# Patient Record
Sex: MALE | Race: WHITE | NOT HISPANIC OR LATINO | Employment: FULL TIME | URBAN - METROPOLITAN AREA
[De-identification: names, ages, dates, MRNs, and addresses within clinical notes are randomized per-mention and may not be internally consistent; named-entity substitution may affect disease eponyms.]

---

## 2021-04-15 DIAGNOSIS — Z23 ENCOUNTER FOR IMMUNIZATION: ICD-10-CM

## 2025-01-18 ENCOUNTER — APPOINTMENT (EMERGENCY)
Dept: RADIOLOGY | Facility: HOSPITAL | Age: 55
End: 2025-01-18
Payer: COMMERCIAL

## 2025-01-18 ENCOUNTER — HOSPITAL ENCOUNTER (EMERGENCY)
Facility: HOSPITAL | Age: 55
Discharge: HOME | End: 2025-01-18
Attending: EMERGENCY MEDICINE | Admitting: EMERGENCY MEDICINE
Payer: COMMERCIAL

## 2025-01-18 VITALS
RESPIRATION RATE: 14 BRPM | TEMPERATURE: 97.3 F | HEART RATE: 80 BPM | OXYGEN SATURATION: 100 % | DIASTOLIC BLOOD PRESSURE: 90 MMHG | HEIGHT: 72 IN | BODY MASS INDEX: 34.9 KG/M2 | WEIGHT: 257.7 LBS | SYSTOLIC BLOOD PRESSURE: 133 MMHG

## 2025-01-18 DIAGNOSIS — J18.9 PNEUMONIA OF RIGHT LOWER LOBE DUE TO INFECTIOUS ORGANISM: Primary | ICD-10-CM

## 2025-01-18 DIAGNOSIS — R07.9 CHEST PAIN, UNSPECIFIED TYPE: ICD-10-CM

## 2025-01-18 LAB
ALBUMIN SERPL-MCNC: 4.3 G/DL (ref 3.5–5.7)
ALP SERPL-CCNC: 44 IU/L (ref 34–125)
ALT SERPL-CCNC: 36 IU/L (ref 7–52)
ANION GAP SERPL CALC-SCNC: 5 MEQ/L (ref 3–15)
AST SERPL-CCNC: 26 IU/L (ref 13–39)
ATRIAL RATE: 60
BASOPHILS # BLD: 0.06 K/UL (ref 0.01–0.1)
BASOPHILS NFR BLD: 0.9 %
BILIRUB SERPL-MCNC: 0.6 MG/DL (ref 0.3–1.2)
BUN SERPL-MCNC: 18 MG/DL (ref 7–25)
CALCIUM SERPL-MCNC: 9.2 MG/DL (ref 8.6–10.3)
CHLORIDE SERPL-SCNC: 103 MEQ/L (ref 98–107)
CO2 SERPL-SCNC: 31 MEQ/L (ref 21–31)
CREAT SERPL-MCNC: 0.8 MG/DL (ref 0.7–1.3)
D DIMER PPP IA.FEU-MCNC: <0.27 UG/ML FEU (ref 0–0.5)
DIFFERENTIAL METHOD BLD: ABNORMAL
EGFRCR SERPLBLD CKD-EPI 2021: >60 ML/MIN/1.73M*2
EOSINOPHIL # BLD: 0.26 K/UL (ref 0.04–0.54)
EOSINOPHIL NFR BLD: 3.7 %
ERYTHROCYTE [DISTWIDTH] IN BLOOD BY AUTOMATED COUNT: 13.1 % (ref 11.6–14.4)
GLUCOSE SERPL-MCNC: 110 MG/DL (ref 70–99)
HCT VFR BLD AUTO: 46.7 % (ref 40.1–51)
HGB BLD-MCNC: 15.5 G/DL (ref 13.7–17.5)
IMM GRANULOCYTES # BLD AUTO: 0.03 K/UL (ref 0–0.08)
IMM GRANULOCYTES NFR BLD AUTO: 0.4 %
LYMPHOCYTES # BLD: 2.26 K/UL (ref 1.2–3.5)
LYMPHOCYTES NFR BLD: 32.5 %
MCH RBC QN AUTO: 29.1 PG (ref 28–33.2)
MCHC RBC AUTO-ENTMCNC: 33.2 G/DL (ref 32.2–36.5)
MCV RBC AUTO: 87.6 FL (ref 83–98)
MONOCYTES # BLD: 0.67 K/UL (ref 0.3–1)
MONOCYTES NFR BLD: 9.6 %
NEUTROPHILS # BLD: 3.68 K/UL (ref 1.7–7)
NEUTS SEG NFR BLD: 52.9 %
NRBC BLD-RTO: 0 %
P AXIS: 41
PLATELET # BLD AUTO: 211 K/UL (ref 150–350)
PMV BLD AUTO: 8.4 FL (ref 9.4–12.4)
POTASSIUM SERPL-SCNC: 4.6 MEQ/L (ref 3.5–5.1)
PR INTERVAL: 182
PROT SERPL-MCNC: 7.3 G/DL (ref 6–8.2)
QRS DURATION: 86
QT INTERVAL: 410
QTC CALCULATION(BAZETT): 410
R AXIS: 40
RBC # BLD AUTO: 5.33 M/UL (ref 4.5–5.8)
SODIUM SERPL-SCNC: 139 MEQ/L (ref 136–145)
T WAVE AXIS: 47
TROPONIN I SERPL HS-MCNC: 3.8 PG/ML
TROPONIN I SERPL HS-MCNC: 5.1 PG/ML
VENTRICULAR RATE: 60
WBC # BLD AUTO: 6.96 K/UL (ref 3.8–10.5)

## 2025-01-18 PROCEDURE — 85025 COMPLETE CBC W/AUTO DIFF WBC: CPT

## 2025-01-18 PROCEDURE — 80053 COMPREHEN METABOLIC PANEL: CPT

## 2025-01-18 PROCEDURE — 93005 ELECTROCARDIOGRAM TRACING: CPT | Performed by: EMERGENCY MEDICINE

## 2025-01-18 PROCEDURE — 84484 ASSAY OF TROPONIN QUANT: CPT | Performed by: EMERGENCY MEDICINE

## 2025-01-18 PROCEDURE — 71046 X-RAY EXAM CHEST 2 VIEWS: CPT

## 2025-01-18 PROCEDURE — 85379 FIBRIN DEGRADATION QUANT: CPT

## 2025-01-18 PROCEDURE — 36415 COLL VENOUS BLD VENIPUNCTURE: CPT

## 2025-01-18 PROCEDURE — 93005 ELECTROCARDIOGRAM TRACING: CPT

## 2025-01-18 PROCEDURE — 99283 EMERGENCY DEPT VISIT LOW MDM: CPT | Mod: 25

## 2025-01-18 PROCEDURE — 80053 COMPREHEN METABOLIC PANEL: CPT | Performed by: EMERGENCY MEDICINE

## 2025-01-18 PROCEDURE — 84484 ASSAY OF TROPONIN QUANT: CPT

## 2025-01-18 PROCEDURE — 84484 ASSAY OF TROPONIN QUANT: CPT | Mod: 91 | Performed by: EMERGENCY MEDICINE

## 2025-01-18 PROCEDURE — 85025 COMPLETE CBC W/AUTO DIFF WBC: CPT | Performed by: EMERGENCY MEDICINE

## 2025-01-18 RX ORDER — ENALAPRIL MALEATE 10 MG/1
TABLET ORAL
COMMUNITY
Start: 2025-01-17

## 2025-01-18 RX ORDER — AZITHROMYCIN 250 MG/1
TABLET, FILM COATED ORAL
Qty: 6 TABLET | Refills: 0 | Status: SHIPPED | OUTPATIENT
Start: 2025-01-18

## 2025-01-18 RX ORDER — CEFPODOXIME PROXETIL 100 MG/1
100 TABLET, FILM COATED ORAL 2 TIMES DAILY
Qty: 14 TABLET | Refills: 0 | Status: SHIPPED | OUTPATIENT
Start: 2025-01-18 | End: 2025-01-25

## 2025-01-18 RX ORDER — ALLOPURINOL 300 MG/1
TABLET ORAL
COMMUNITY
Start: 2024-09-20

## 2025-01-18 RX ORDER — ROSUVASTATIN CALCIUM 10 MG/1
TABLET, COATED ORAL
COMMUNITY
Start: 2024-12-16 | End: 2025-09-12

## 2025-01-18 NOTE — ED ATTESTATION NOTE
I have personally seen and examined Maikol Howell.  I was involved in the care and medical decision making for this patient.    I reviewed and agree with physician assistant / nurse practitioner’s assessment and plan of care; any exceptions are documented below.      My focused history, examination, assessment and plan of care of Maikol Howell is as follows:    Brief History:    Chest Pain      54-year-old male presents emergency room for evaluation of some left-sided achy chest discomfort that began about 3:30 AM this morning.  He states he has had a cough for several weeks.  No abdominal pain.  No vomiting.  No leg pain or swelling.    Focused Physical Exam:  Physical Exam  Constitutional:       General: He is not in acute distress.     Appearance: He is not toxic-appearing.   Neck:      Vascular: No JVD.   Cardiovascular:      Rate and Rhythm: Normal rate and regular rhythm.      Pulses:           Carotid pulses are 2+ on the right side and 2+ on the left side.       Radial pulses are 2+ on the right side and 2+ on the left side.        Dorsalis pedis pulses are 2+ on the right side and 2+ on the left side.        Posterior tibial pulses are 2+ on the right side and 2+ on the left side.   Pulmonary:      Effort: No tachypnea.      Breath sounds: No stridor. No decreased breath sounds or wheezing.   Abdominal:      Palpations: There is no mass.      Tenderness: There is no abdominal tenderness. There is no rebound.   Neurological:      Mental Status: He is alert and oriented to person, place, and time.             Assessment / Plan:    Chest Pain    Medical Decision Making      I was physically present for the key/critical portions of the following procedures:  Procedures         José Miguel Hankins,   01/18/25 0758

## 2025-01-18 NOTE — ED PROVIDER NOTES
Emergency Medicine Note  HPI   HISTORY OF PRESENT ILLNESS     54-year-old male who presents emergency room for evaluation of left-sided chest pain and shortness of breath that has been worsening since 3:30 AM.  Reports he has had a cough since the giving, no production.  No sick contacts.  No fevers or chills, nausea or vomiting, leg pain or swelling, travel or surgery.  No history of blood clots.  No history of heart disease but has been told he is high risk by his primary doctor. Reports he also did a workout yesterday for the first time in a while and is unsure if he pulled something. Pain is worse with movement, but not pleuritic, exertional, or positional. No other complaints at this time.          Patient History   PAST HISTORY     Reviewed from Nursing Triage:       Past Medical History:   Diagnosis Date    Gout     Hypertension     Lipid disorder        Past Surgical History   Procedure Laterality Date    Mandible fracture surgery         No family history on file.    Social History     Tobacco Use    Smoking status: Never    Smokeless tobacco: Never   Substance Use Topics    Alcohol use: Yes     Comment: weekends    Drug use: Yes     Types: Marijuana         Review of Systems   REVIEW OF SYSTEMS     Review of Systems      VITALS     ED Vitals      Date/Time Temp Pulse Resp BP SpO2 [facility]   01/18/25 0849 -- 66 14 133/90 100 % SFC   01/18/25 0651 -- 63 20 129/89 97 % JAG   01/18/25 0553 36.3 °C (97.3 °F) 63 16 142/88 100 % CLK          Pulse Ox %: 97 % (01/18/25 0732)  Pulse Ox Interpretation: Normal (01/18/25 0732)  Heart Rate: 63 (01/18/25 0732)  Rhythm Strip Interpretation: Normal Sinus Rhythm (01/18/25 0732)     Physical Exam   PHYSICAL EXAM     Physical Exam  Vitals and nursing note reviewed.   Constitutional:       General: He is not in acute distress.     Appearance: He is obese. He is not ill-appearing.   HENT:      Head: Normocephalic and atraumatic.   Eyes:      Conjunctiva/sclera: Conjunctivae normal.    Cardiovascular:      Rate and Rhythm: Normal rate and regular rhythm.   Pulmonary:      Effort: Pulmonary effort is normal.      Breath sounds: Normal breath sounds. No decreased breath sounds, wheezing, rhonchi or rales.   Chest:      Chest wall: Tenderness (No TTP but reproducible ttp with rotation of the trunk to the patients left) present.   Abdominal:      General: There is no distension.      Palpations: Abdomen is soft. There is no mass.      Tenderness: There is no abdominal tenderness.   Musculoskeletal:         General: No tenderness or deformity. Normal range of motion.      Cervical back: Normal range of motion.      Right lower leg: No tenderness. No edema.      Left lower leg: No tenderness. No edema.   Skin:     General: Skin is warm and dry.   Neurological:      Mental Status: He is alert. Mental status is at baseline.   Psychiatric:         Behavior: Behavior normal.           PROCEDURES     Procedures     DATA     Results       Procedure Component Value Units Date/Time    D-dimer, quantitative [[id number]]  (Normal) Collected: 01/18/25 0727    Specimen: Blood, Venous Updated: 01/18/25 0809     D-Dimer, Quant <0.27 ug/mL FEU      Comment: Suggested Age Related Reference Range: 0.00 - 0.56  The D-Dimer assay can be used as an aid in the diagnosis of DVT or PE. The test can not be used by itself to exclude DVT or PE. When used as a diagnostic aid, the cutoff value is the same as the reference range: <0.5 ug/ml FEU.       HS Troponin (with 2 hour reflex) [[id number]]  (Normal) Collected: 01/18/25 0604    Specimen: Blood, Venous Updated: 01/18/25 0639     High Sens Troponin I 5.1 pg/mL     Comprehensive metabolic panel [[id number]]  (Abnormal) Collected: 01/18/25 0604    Specimen: Blood, Venous Updated: 01/18/25 0634     Sodium 139 mEQ/L      Potassium 4.6 mEQ/L      Comment: Results obtained on plasma. Plasma Potassium values may be up to 0.4 mEQ/L less than serum values. The differences may be greater  for patients with high platelet or white cell counts.        Chloride 103 mEQ/L      CO2 31 mEQ/L      BUN 18 mg/dL      Creatinine 0.8 mg/dL      Glucose 110 mg/dL      Calcium 9.2 mg/dL      AST (SGOT) 26 IU/L      ALT (SGPT) 36 IU/L      Alkaline Phosphatase 44 IU/L      Total Protein 7.3 g/dL      Comment: Test performed on plasma which typically contains approximately 0.4 g/dL more protein than serum.        Albumin 4.3 g/dL      Bilirubin, Total 0.6 mg/dL      eGFR >60.0 mL/min/1.73m*2      Comment: Calculation based on the Chronic Kidney Disease Epidemiology Collaboration (CKD-EPI) equation refit without adjustment for race.        Anion Gap 5 mEQ/L     CBC and differential [[id number]]  (Abnormal) Collected: 01/18/25 0604    Specimen: Blood, Venous Updated: 01/18/25 0614     WBC 6.96 K/uL      RBC 5.33 M/uL      Hemoglobin 15.5 g/dL      Hematocrit 46.7 %      MCV 87.6 fL      MCH 29.1 pg      MCHC 33.2 g/dL      RDW 13.1 %      Platelets 211 K/uL      MPV 8.4 fL      Differential Type Auto     nRBC 0.0 %      Immature Granulocytes 0.4 %      Neutrophils 52.9 %      Lymphocytes 32.5 %      Monocytes 9.6 %      Eosinophils 3.7 %      Basophils 0.9 %      Immature Granulocytes, Absolute 0.03 K/uL      Neutrophils, Absolute 3.68 K/uL      Lymphocytes, Absolute 2.26 K/uL      Monocytes, Absolute 0.67 K/uL      Eosinophils, Absolute 0.26 K/uL      Basophils, Absolute 0.06 K/uL             Imaging Results              X-RAY CHEST 2 VIEWS (Preliminary result)  Result time 01/18/25 09:38:05      Preliminary Interpretation    RLL PNA                                    ECG 12 lead   Independent Interpretation by ED Provider   NSR at 60 BPM, no STEMI, as interpreted by Dr. Hernandez, reviewed by myself          Scoring tools                                  ED Course & MDM   MDM / ED COURSE / CLINICAL IMPRESSION / DISPO     Medical Decision Making  Cardiac workup including d-dimer  Exam unremarkable  Possible MSK given  workout, persistent cough x 2 months, but r/o ACS, PE, PNA  Discussed with Dr. Hankins, my attending, who evaluated the patient as well and agrees with the plan    Amount and/or Complexity of Data Reviewed  Labs: ordered. Decision-making details documented in ED Course.  Radiology: ordered and independent interpretation performed.  ECG/medicine tests: ordered and independent interpretation performed.    Risk  Prescription drug management.        ED Course as of 01/18/25 0957   Sat Jan 18, 2025   0812 High Sens Troponin I: 5.1 [MR]   0812 D-Dimer, Quant: <0.27 [MR]   0945 CXR with RLL PNA.  Will prescribe Vantin and azithromycin.  Recommend follow-up with primary doctor for repeat x-ray in 2 to 3 months to check for resolution.  Could also be musculoskeletal specifically on the right side in the setting of the workout yesterday. Will follow up with cardiology as well.  Questions elicited and answered, return precautions discussed. [TD]      ED Course User Index  [MR] José Miguel Hankins T, DO  [TD] Lasha Braun PA C     Clinical Impression      Pneumonia of right lower lobe due to infectious organism   Chest pain, unspecified type     _________________       ED Disposition   Discharge                       Lasha Braun PA C  01/18/25 0956       Lasha Braun PA C  01/18/25 0957

## 2025-01-18 NOTE — DISCHARGE INSTRUCTIONS
Please follow up with your primary doctor for repeat CXR in 2-3 months. You can establish with a new PCP if needed with the [facility] physician referral number like we discussed. Return to the ED for any increased chest pain, trouble breathing, nausea, sweating, vomiting, cough, fevers, weakness or numbness, or any worsening of symptoms. Follow up with your healthcare provider for re-evaluation.

## 2025-02-10 ENCOUNTER — HOSPITAL ENCOUNTER (OUTPATIENT)
Dept: RADIOLOGY | Age: 55
Discharge: HOME | End: 2025-02-10
Attending: FAMILY MEDICINE
Payer: COMMERCIAL

## 2025-02-10 ENCOUNTER — TRANSCRIBE ORDERS (OUTPATIENT)
Dept: RADIOLOGY | Age: 55
End: 2025-02-10

## 2025-02-10 DIAGNOSIS — J13 PNEUMONIA DUE TO STREPTOCOCCUS PNEUMONIAE (CMS/HCC): ICD-10-CM

## 2025-02-10 DIAGNOSIS — J13 PNEUMONIA DUE TO STREPTOCOCCUS PNEUMONIAE (CMS/HCC): Primary | ICD-10-CM

## 2025-02-10 PROCEDURE — 71046 X-RAY EXAM CHEST 2 VIEWS: CPT
